# Patient Record
Sex: MALE | Race: WHITE | NOT HISPANIC OR LATINO | URBAN - METROPOLITAN AREA
[De-identification: names, ages, dates, MRNs, and addresses within clinical notes are randomized per-mention and may not be internally consistent; named-entity substitution may affect disease eponyms.]

---

## 2024-08-27 ENCOUNTER — CONSULT (OUTPATIENT)
Dept: ENDOCRINOLOGY | Facility: CLINIC | Age: 44
End: 2024-08-27
Payer: COMMERCIAL

## 2024-08-27 ENCOUNTER — LAB (OUTPATIENT)
Dept: LAB | Facility: LAB | Age: 44
End: 2024-08-27
Payer: COMMERCIAL

## 2024-08-27 DIAGNOSIS — Z11.3 ENCOUNTER FOR SCREENING EXAMINATION FOR SEXUALLY TRANSMITTED DISEASE: ICD-10-CM

## 2024-08-27 DIAGNOSIS — Z31.41 FERTILITY TESTING: ICD-10-CM

## 2024-08-27 DIAGNOSIS — Z11.3 ENCOUNTER FOR SCREENING EXAMINATION FOR SEXUALLY TRANSMITTED DISEASE: Primary | ICD-10-CM

## 2024-08-27 LAB
HBV SURFACE AG SERPL QL IA: NONREACTIVE
HCV AB SER QL: NONREACTIVE
HIV 1+2 AB+HIV1 P24 AG SERPL QL IA: NONREACTIVE
TREPONEMA PALLIDUM IGG+IGM AB [PRESENCE] IN SERUM OR PLASMA BY IMMUNOASSAY: NONREACTIVE

## 2024-08-27 PROCEDURE — 87340 HEPATITIS B SURFACE AG IA: CPT

## 2024-08-27 PROCEDURE — 87389 HIV-1 AG W/HIV-1&-2 AB AG IA: CPT

## 2024-08-27 PROCEDURE — 36415 COLL VENOUS BLD VENIPUNCTURE: CPT

## 2024-08-27 PROCEDURE — 86780 TREPONEMA PALLIDUM: CPT

## 2024-08-27 PROCEDURE — 99202 OFFICE O/P NEW SF 15 MIN: CPT | Performed by: NURSE PRACTITIONER

## 2024-08-27 PROCEDURE — 86803 HEPATITIS C AB TEST: CPT

## 2024-08-27 PROCEDURE — 87591 N.GONORRHOEAE DNA AMP PROB: CPT

## 2024-08-27 PROCEDURE — 87491 CHLMYD TRACH DNA AMP PROBE: CPT

## 2024-08-27 PROCEDURE — 99212 OFFICE O/P EST SF 10 MIN: CPT | Performed by: NURSE PRACTITIONER

## 2024-08-27 NOTE — PROGRESS NOTES
Virtual or Telephone Consent: An interactive audio and video telecommunication system which permits real time communications between the patient (at the originating site) and provider (at the distant site) was utilized to provide this telehealth service    NEW FERTILITY PATIENT VISIT     Jared Reis is a 43 y.o. here with his wife Lisa Cabrera for a fertility work up.    Prior Labs  Lab Results    Date Done      AMH: No results found for requested labs within last 1825 days. No results found for requested labs within last 1825 days.   TSH: No results found for requested labs within last 1825 days. No results found for requested labs within last 1825 days.   PRL: No results found for requested labs within last 1825 days. No results found for requested labs within last 1825 days.   Testosterone: No results found for requested labs within last 1825 days. No results found for requested labs within last 1825 days.   DHEAS: No results found for requested labs within last 1825 days. No results found for requested labs within last 1825 days.   FSH: No results found for requested labs within last 1825 days. No results found for requested labs within last 1825 days.   17 OHP: No results found for requested labs within last 1825 days. No results found for requested labs within last 1825 days.   HgbA1c: 6.0 (H; Ref range: 4.3 - 5.6 %) 4/18/2024   Hepatitis B surface antigen: No results found for requested labs within last 1825 days. No results found for requested labs within last 1825 days.   Hepatitis C antibody: No results found for requested labs within last 1825 days. No results found for requested labs within last 1825 days.   HIV ½ Antigen Antibody screen with reflex: No results found for requested labs within last 1825 days. No results found for requested labs within last 1825 days.   Syphilis screening with reflex: No results found for requested labs within last 1825 days. No results found for requested  labs within last 1825 days.   GC: No results found for requested labs within last 1825 days. No results found for requested labs within last 1825 days.   CT: No results found for requested labs within last 1825 days. No results found for requested labs within last 1825 days.   Type and Screen: No results found for requested labs within last 1825 days. No results found for requested labs within last 1825 days.   Rh: No results found for requested labs within last 1825 days. No results found for requested labs within last 1825 days.   Antibody: No results found for requested labs within last 1825 days. No results found for requested labs within last 1825 days.   Rubella: No results found for requested labs within last 1825 days. No results found for requested labs within last 1825 days.   Varicella: No results found for requested labs within last 1825 days. No results found for requested labs within last 1825 days.   Hemoglobin: No results found for requested labs within last 1825 days. No results found for requested labs within last 1825 days.   Hematocrit: No results found for requested labs within last 1825 days. No results found for requested labs within last 1825 days.   Creatinine: No results found for requested labs within last 1825 days. No results found for requested labs within last 1825 days.   AST:No results found for requested labs within last 1825 days. No results found for requested labs within last 1825 days.   ALT:No results found for requested labs within last 1825 days.: No results found for requested labs within last 1825 days.      Relationship Status:     PMH  No past medical history on file.     MEDICATIONS  No current outpatient medications on file prior to visit.     No current facility-administered medications on file prior to visit.       PSH  No past surgical history on file.        PARTNER HISTORY       PARTNER HISTORY  Partner Name: Jared Chato  Partner :  [birth year]  Partner email: [email or web address]  Occupation: IT  Prior fertility history: one miscarriage, one 11 year old child  PMH: GLEN, high blood pressure, hyperlipidemia  PSH: appendectomy in 2018  Smoking:No  Alcohol Use: Yes  Drug Use: No  Medications: Losartan - 100 mg - daily (fenofibrate - 145 mg - daily) vitamin d2 - 1.25mg - weekly (tadalafil - 5mg - daily)  Injuries: No  STD: Yes  Please select all that are applicable: chlamydia  SA: No  SA Results:  No  No testosterone use      BMI:   BMI Readings from Last 1 Encounters:   05/04/21 33.91 kg/m²     VITALS:  There were no vitals taken for this visit.  LMP: No LMP for male patient.    ASSESSMENT   43 y.o. here with his partner. Updated HPI for testing.    COUNSELING  INSTRUCTIONS FOR INFERTILITY TESTING    Semen Analysis  The semen samples that you have been asked to submit are important for diagnostic and therapeutic purposes.  Please abstain from ejaculating 2-3 days prior to collecting the sample.  The sample should be produced by masturbation.  You will need to collect the ejaculate into the container supplied by the [facility] (you can get containers at your doctor's office).  Do not use other plastic containers from home such as Tupperware as these containers as they may interfere with the test results.  Lubricants and saliva also interfere with test results.  If a collection condom is necessary, please request one at the [facility] lab and they will provide you with an appropriate collection condom.  It is extremely important that the entire sample is collected in the container as the first few drops of ejaculate contain a major portion of sperm.  If you do not collect the entire specimen, please inform the technologist.      You can collect at home as long as you can get the sample to the [facility] lab within 1 hour of collecting.  Please ryan your name and time of collection on the container. You should carry the container close to your  body.  Collection rooms are available at all locations as well.     An appointment is needed to ensure the lab has enough time to process your specimen.  Please call [phone] to schedule this appointment.     Routine Testing  Fertility Center  STDs Within 1 year   Genetic carrier Waiver/Completed   T&S Within 1 year   AMH Within 1 year   TSH Within 1 year   Rubella/Varicella Within 5 years     BMI Testing  Fertility Center  CBC Within 1 year   CMP Within 1 year   HgbA1c Within 1 year   Mag, Phos, Vit D <18 Within 1 year   MFM > 40  REQ   Wt loss consult > 40 OPT     PLAN  Orders Placed This Encounter   Procedures    Hepatitis B surface antigen    Hepatitis C antibody    HIV 1/2 Antigen/Antibody Screen with Reflex to Confirmation    Syphilis Screen with Reflex    C. Trachomatis / N. Gonorrhoeae, Amplified Detection    POCT Semen Analysis Complete with Strict Morphology           FOLLOW UP   Follow up with Lisa.    Marilyn Quiñones  08/27/2024  2:03 PM

## 2024-08-28 LAB
C TRACH RRNA SPEC QL NAA+PROBE: NEGATIVE
N GONORRHOEA DNA SPEC QL PROBE+SIG AMP: NEGATIVE

## 2024-10-11 ENCOUNTER — ANCILLARY PROCEDURE (OUTPATIENT)
Dept: ENDOCRINOLOGY | Facility: CLINIC | Age: 44
End: 2024-10-11
Payer: COMMERCIAL

## 2024-10-11 DIAGNOSIS — Z31.41 FERTILITY TESTING: ICD-10-CM

## 2024-10-11 LAB
% EX RESIDUAL CYTOPLASM (SEMEN): 0 %
% EX RESIDUAL CYTOPLASM (SEMEN): 0 %
% HEAD DEFECTS (SEMEN): 93.8 %
% HEAD DEFECTS (SEMEN): 93.8 %
% NECK MIDPIECE (SEMEN): 22.3 %
% NECK MIDPIECE (SEMEN): 22.3 %
% NORMAL (SEMEN): 5.8 % (ref 4–?)
% NORMAL (SEMEN): 5.8 % (ref 4–?)
% TAIL DEFECTS (SEMEN): 5 %
% TAIL DEFECTS (SEMEN): 5 %
ABSTINENCE (DAYS): 4 DAYS (ref 2–7)
ABSTINENCE (DAYS): 4 DAYS (ref 2–7)
AGGLUTINATION (SEMEN): NO
AGGLUTINATION (SEMEN): NO
AMOUNT MISSED:: ABNORMAL
ANALYZED TIME:: ABNORMAL
ANALYZED TIME:: ABNORMAL
ANDROLOGY LAB ID#: ABNORMAL
ANDROLOGY LAB ID#: ABNORMAL
CLUMPS (SEMEN): NO
CLUMPS (SEMEN): NO
COLLECTED COMPLETELY: YES
COLLECTED COMPLETELY: YES
COLLECTION LOCATION:: ABNORMAL
COLLECTION LOCATION:: ABNORMAL
COLLECTION METHOD:: ABNORMAL
COLLECTION METHOD:: ABNORMAL
CONCENTRATION (URINE): ABNORMAL
CONCENTRATION CN (POST-WASH): ABNORMAL
CONCENTRATION(SEMEN): 187.62 MILL/ML (ref 15–?)
CONCENTRATION(SEMEN): 187.62 MILL/ML (ref 15–?)
DEBRIS (SEMEN): YES
DEBRIS (SEMEN): YES
DEGREE (SEMEN): ABNORMAL
LEUKOCYTE (SEMEN): NEGATIVE
LEUKOCYTE (SEMEN): NEGATIVE
NON PROG. MOTILITY (SEMEN): 9 %
NON PROG. MOTILITY (SEMEN): 9 %
NON PROG. MOTILITY (URINE): ABNORMAL
NON PROG. MOTILITY CN (POST-WASH): ABNORMAL
PATTERN (SEMEN): ABNORMAL
PORTION MISSED REI: ABNORMAL
PROG. MOTILITY (SEMEN): 52 % (ref 32–?)
PROG. MOTILITY (SEMEN): 52 % (ref 32–?)
PROG. MOTILITY (URINE): ABNORMAL
PROG. MOTILITY CN (POST-WASH): ABNORMAL
RECEIVED TIME:: ABNORMAL
RECEIVED TIME:: ABNORMAL
REI PARTNER DOB: ABNORMAL
REI PARTNER DOB: ABNORMAL
REI PARTNER NAME: ABNORMAL
REI PARTNER NAME: ABNORMAL
SEMEN APPEARANCE: NORMAL
SEMEN APPEARANCE: NORMAL
SEMEN LIQUEFACTION: NORMAL
SEMEN LIQUEFACTION: NORMAL
SEMEN VISCOSITY: NORMAL
SEMEN VISCOSITY: NORMAL
TOT. NO OF NORM. MOTILE SPERM (SEMEN): 6.21 MILL
TOT. NO OF NORM. MOTILE SPERM (SEMEN): 6.21 MILL
TOT. NO OF NORM. SPERM (SEMEN): 10.25 MILL
TOT. NO OF NORM. SPERM (SEMEN): 10.25 MILL
TOTAL MOTILITY (SEMEN): 61 % (ref 40–?)
TOTAL MOTILITY (SEMEN): 61 % (ref 40–?)
TOTAL MOTILITY (URINE): ABNORMAL
TOTAL MOTILITY CN (POST-WASH): ABNORMAL
TOTAL NO OF MOTILE (SEMEN): 108.07 MILL
TOTAL NO OF MOTILE (SEMEN): 108.07 MILL
TOTAL NO OF MOTILE (URINE): ABNORMAL
TOTAL NO OF MOTILE CN (POST-WASH): ABNORMAL
TOTAL NO OF RND CELLS (SEMEN): 3.8 MILL (ref ?–5)
TOTAL NO OF RND CELLS (SEMEN): 3.8 MILL (ref ?–5)
TOTAL NO OF RND CELLS (URINE): ABNORMAL
TOTAL NO OF SPERM (SEMEN): 178.24 MILL (ref 39–?)
TOTAL NO OF SPERM (SEMEN): 178.24 MILL (ref 39–?)
TOTAL NO OF SPERM (URINE): ABNORMAL
TOTAL NO OF SPERM CN (POST-WASH): ABNORMAL
VOLUME (SEMEN): 0.95 ML (ref 1.5–?)
VOLUME (SEMEN): 0.95 ML (ref 1.5–?)
VOLUME CN (POST-WASH): ABNORMAL
VOLUME OF URINE: ABNORMAL

## 2024-10-11 PROCEDURE — 89322 SEMEN ANAL STRICT CRITERIA: CPT | Mod: 91 | Performed by: STUDENT IN AN ORGANIZED HEALTH CARE EDUCATION/TRAINING PROGRAM

## 2024-10-11 PROCEDURE — 89322 SEMEN ANAL STRICT CRITERIA: CPT | Performed by: STUDENT IN AN ORGANIZED HEALTH CARE EDUCATION/TRAINING PROGRAM

## 2024-10-16 DIAGNOSIS — Z13.71 SCREENING FOR GENETIC DISEASE CARRIER STATUS: Primary | ICD-10-CM

## 2024-10-19 ENCOUNTER — LAB (OUTPATIENT)
Dept: LAB | Facility: LAB | Age: 44
End: 2024-10-19
Payer: COMMERCIAL

## 2024-11-09 LAB — COMMENTS - MP RESULT TYPE: NORMAL

## 2025-03-06 DIAGNOSIS — Z31.41 FERTILITY TESTING: Primary | ICD-10-CM

## 2025-03-17 DIAGNOSIS — Z31.41 FERTILITY TESTING: ICD-10-CM

## 2025-03-20 ENCOUNTER — ANCILLARY PROCEDURE (OUTPATIENT)
Dept: ENDOCRINOLOGY | Facility: CLINIC | Age: 45
End: 2025-03-20

## 2025-03-20 DIAGNOSIS — Z31.41 FERTILITY TESTING: ICD-10-CM

## 2025-03-20 LAB
# OF VIALS: 2
% EX RESIDUAL CYTOPLASM (SEMEN): 0.5 %
% HEAD DEFECTS (SEMEN): 95 %
% NECK MIDPIECE (SEMEN): 17.5 %
% NORMAL (SEMEN): 5 % (ref 4–?)
% TAIL DEFECTS (SEMEN): 7.3 %
ABSTINENCE (DAYS): 4 DAYS (ref 2–7)
AGGLUTINATION (SEMEN): NO
AMOUNT MISSED:: ABNORMAL
ANALYZED TIME:: ABNORMAL
ANDROLOGY LAB ID#: ABNORMAL
CLUMPS (SEMEN): YES
COLLECTED COMPLETELY: NO
COLLECTION LOCATION:: ABNORMAL
COLLECTION METHOD:: ABNORMAL
CONCENTRATION CN (POST-WASH): 183.64 MILL/ML
CONCENTRATION(SEMEN): 450 MILL/ML (ref 15–?)
DEBRIS (SEMEN): YES
NON PROG. MOTILITY (SEMEN): 3 %
NON PROG. MOTILITY CN (POST-WASH): 4 %
PORTION MISSED REI: ABNORMAL
PROG. MOTILITY (SEMEN): 39 % (ref 32–?)
PROG. MOTILITY CN (POST-WASH): 47 %
RECEIVED TIME:: ABNORMAL
REI PARTNER DOB: ABNORMAL
REI PARTNER NAME: ABNORMAL
SEMEN APPEARANCE: NORMAL
SEMEN LIQUEFACTION: NORMAL
SEMEN VISCOSITY: NORMAL
SPECIMEN ID REI: ABNORMAL
TOT. NO OF NORM. MOTILE SPERM (SEMEN): 5.7 MILL
TOT. NO OF NORM. SPERM (SEMEN): 13.5 MILL
TOTAL MOTILE SPERM PER VIAL (POST-THAW): 31.95 MILL/VIAL
TOTAL MOTILITY (SEMEN): 42 % (ref 40–?)
TOTAL MOTILITY CN (POST-WASH): 50 %
TOTAL NO OF MOTILE (SEMEN): 113.93 MILL
TOTAL NO OF MOTILE CN (POST-WASH): 111.24 MILL
TOTAL NO OF SPERM (SEMEN): 270 MILL (ref 39–?)
TOTAL NO OF SPERM CN (POST-WASH): 220.36 MILL
UNIT VOLUME: ABNORMAL
VOLUME (SEMEN): 0.6 ML (ref 1.5–?)
VOLUME CN (POST-WASH): 1.2 ML

## 2025-03-20 PROCEDURE — 89322 SEMEN ANAL STRICT CRITERIA: CPT | Performed by: STUDENT IN AN ORGANIZED HEALTH CARE EDUCATION/TRAINING PROGRAM

## 2025-03-20 PROCEDURE — 89259 CRYOPRESERVATION SPERM: CPT | Performed by: STUDENT IN AN ORGANIZED HEALTH CARE EDUCATION/TRAINING PROGRAM

## 2025-07-15 DIAGNOSIS — Z31.41 FERTILITY TESTING: ICD-10-CM

## 2025-07-30 ENCOUNTER — ANCILLARY PROCEDURE (OUTPATIENT)
Dept: ENDOCRINOLOGY | Facility: CLINIC | Age: 45
End: 2025-07-30

## 2025-07-30 DIAGNOSIS — Z31.41 FERTILITY TESTING: ICD-10-CM

## 2025-07-30 LAB
ABSTINENCE (DAYS): 4 DAYS (ref 2–7)
AGGLUTINATION (SEMEN): NO
ANALYZED TIME:: ABNORMAL
ANDROLOGY LAB ID#: ABNORMAL
CLUMPS (SEMEN): YES
COLLECTED COMPLETELY: YES
COLLECTION LOCATION:: ABNORMAL
COLLECTION METHOD:: ABNORMAL
CONCENTRATION CN (POST-WASH): 66.33 MILL/ML
CONCENTRATION(SEMEN): 115.29 MILL/ML (ref 15–?)
DEBRIS (SEMEN): YES
LEUKOCYTE (SEMEN): NEGATIVE
NON PROG. MOTILITY (SEMEN): 1 %
NON PROG. MOTILITY CN (POST-WASH): 4 %
PROG. MOTILITY (SEMEN): 69 % (ref 32–?)
PROG. MOTILITY CN (POST-WASH): 90 %
RECEIVED TIME:: ABNORMAL
REI PARTNER DOB: ABNORMAL
REI PARTNER NAME: ABNORMAL
SEMEN APPEARANCE: NORMAL
SEMEN LIQUEFACTION: NORMAL
SEMEN VISCOSITY: NORMAL
SPERM PROCESS METHOD: ABNORMAL
TOTAL MOTILITY (SEMEN): 70 % (ref 40–?)
TOTAL MOTILITY CN (POST-WASH): 94 %
TOTAL NO OF MOTILE (SEMEN): 60.64 MILL
TOTAL NO OF MOTILE CN (POST-WASH): 31.17 MILL
TOTAL NO OF RND CELLS (SEMEN): 5.9 MILL (ref ?–5)
TOTAL NO OF SPERM (SEMEN): 86.47 MILL (ref 39–?)
TOTAL NO OF SPERM CN (POST-WASH): 33.17 MILL
VOLUME (SEMEN): 0.75 ML (ref 1.5–?)
VOLUME CN (POST-WASH): 0.5 ML